# Patient Record
Sex: MALE | Race: BLACK OR AFRICAN AMERICAN | NOT HISPANIC OR LATINO | Employment: FULL TIME | URBAN - METROPOLITAN AREA
[De-identification: names, ages, dates, MRNs, and addresses within clinical notes are randomized per-mention and may not be internally consistent; named-entity substitution may affect disease eponyms.]

---

## 2021-11-03 ENCOUNTER — OFFICE VISIT (OUTPATIENT)
Dept: SLEEP MEDICINE | Facility: HOSPITAL | Age: 46
End: 2021-11-03

## 2021-11-03 VITALS
WEIGHT: 315 LBS | HEIGHT: 73 IN | HEART RATE: 67 BPM | DIASTOLIC BLOOD PRESSURE: 100 MMHG | SYSTOLIC BLOOD PRESSURE: 166 MMHG | OXYGEN SATURATION: 90 % | BODY MASS INDEX: 41.75 KG/M2

## 2021-11-03 DIAGNOSIS — G47.8 NON-RESTORATIVE SLEEP: ICD-10-CM

## 2021-11-03 DIAGNOSIS — E66.01 MORBID OBESITY (HCC): ICD-10-CM

## 2021-11-03 DIAGNOSIS — G47.33 OBSTRUCTIVE SLEEP APNEA: Primary | ICD-10-CM

## 2021-11-03 DIAGNOSIS — G47.10 HYPERSOMNIA: ICD-10-CM

## 2021-11-03 DIAGNOSIS — G47.26 CIRCADIAN RHYTHM SLEEP DISORDER, SHIFT WORK TYPE: ICD-10-CM

## 2021-11-03 PROCEDURE — 99203 OFFICE O/P NEW LOW 30 MIN: CPT | Performed by: FAMILY MEDICINE

## 2021-11-03 PROCEDURE — G0463 HOSPITAL OUTPT CLINIC VISIT: HCPCS

## 2021-11-03 NOTE — PROGRESS NOTES
[facility] New Patient/Consultation       Reason for Consultation: ANNA      Patient Care Team:  Provider, No Known as PCP - General  Brandon Gallardo MD as Consulting Physician (Sleep Medicine)      History of present illness:  Thank you for asking me to see your patient.  The patient is a 46 y.o. male with history of severe struct of sleep apnea presents today to reestablish care. We have a copy of in lab split study from February 2013. At that time patient weighed 352.1 pounds. Today patient weighs 385 pounds. Found to have severe obstructive sleep apnea with overall AHI of 89.4. He was initially trialed on auto CPAP 12-20 cm H2O. Today patient reports did notice benefit with his auto CPAP machine however was not able to use it consistently enough and insurance took the machine back. He is very motivated to get back into care. He works night shift.    Sleep Schedule:  Bed time: 9 AM  Sleep latency: About 10 minutes  Wake time: 2 PM  Average hours slept: 5-6  Non-restorative sleep: Yes  Number of naps per day: 3-4  Rotating shifts?: Works night shift  Nocturia: Up to 2 times a night  Electronics in bedroom: No    Excessive daytime sleepiness or drowsiness:Y  Any accidents at work due to sleepiness in the last 5 years:N  Any difficulty driving due to sleepiness or being drowsy: N  Weight changed in the last 5 years:Y - GAINED 25 LBS    Snoring:Y  Witnessed apneas:Y  Have you ever awakened gasping for breath, coughing, choking or respiratory discomfort: Y  Morning headaches: Y  Awaken with a sore throat or dry mouth: N    Any reports of leg jerking at night: N  Urge sensations: N  Does pain disrupt sleep: N  Sweating during sleep: Y  Teeth grinding: N    Any sudden episodes of sleep during the day: N  Sleep paralysis/hallucinations: N  Muscle weakness with laughing/anger: N  Nightmares: N  Sleep walking: N    Are you sleepy when you increase your sleep time: Y  Do you sleep better away from your own bed:  "N    ESS: 18    Social History: ; tobacco use ages [age] 2-3 beers per week 1 caffeinated beverage a day    Review of Systems:    A complete review of systems was done and all were negative with the exception of frequent urination joint pain swollen ankles    Allergies:  No known drug allergy    Family History: ANNA no       Current Outpatient Medications:   •  hydrochlorothiazide (HYDRODIURIL) 25 MG tablet, Take 25 mg by mouth daily., Disp: , Rfl:   •  Potassium Chloride crystals, Take  by mouth daily., Disp: , Rfl:   •  prednisoLONE acetate (PRED FORTE) 1 % ophthalmic suspension, Apply  to eye., Disp: , Rfl:   •  Tobramycin-Dexamethasone 0.3-0.05 % suspension, Apply  to eye 2 (two) times a day., Disp: , Rfl:   •  triamcinolone (KENALOG) 0.1 % ointment, Apply  topically 2 (two) times a day., Disp: , Rfl:     Vital Signs:    Vitals:    11/03/21 0700   BP: 166/100   Pulse: 67   SpO2: 90%   Weight: (!) 175 kg (385 lb)   Height: 185.4 cm (73\")      Body mass index is 50.79 kg/m².         Physical Exam:   General Alert and oriented. No acute distress noted   Pharynx/Throat Class IV Mallampati airway, large tongue, no evidence of redundant lateral pharyngeal tissue. No oral lesions. No thrush. Moist mucous membranes.   Head Normocephalic. Symmetrical. Atraumatic.    Nose No septal deviation. No drainage   Chest Wall Normal shape. Symmetric expansion with respiration. No tenderness.   Neck Trachea midline, no thyromegaly or adenopathy    Lungs Clear to auscultation bilaterally. No wheezes. No rhonchi. No rales. Respirations regular, even and unlabored.   Heart Regular rhythm and normal rate. Normal S1 and S2. No murmur   Abdomen Soft, non-tender and non-distended. Normal bowel sounds. No masses.   Extremities Moves all extremities well. No edema   Psychiatric Normal mood and affect.       Impression:  1. Obstructive sleep apnea    2. Hypersomnia    3. Non-restorative sleep    4. Morbid obesity (HCC)    5. " Circadian rhythm sleep disorder, shift work type        Plan:    Good sleep hygiene measures should be maintained.  Weight loss would be beneficial in this patient who is morbidly obese with BMI 50.8.    I discussed the pathophysiology of obstructive sleep apnea with the patient.  We discussed the adverse outcomes associated with untreated sleep-disordered breathing.  We discussed treatment modalities of obstructive sleep apnea including CPAP device as well as oral mandibular advancement device. Sleep study will be scheduled to establish definitive diagnosis of sleep disorder breathing.  Weight loss will be strongly beneficial in order to reduce the severity of sleep-disordered breathing.  Patient has narrow oropharyngeal structure.  Caution during activities that require prolonged concentration is strongly advised.  Patient will be notified of sleep study results after sleep study is completed.  If sleep apnea is only mild,  oral mandibular advancement device may be one of the treatment options.  However if sleep apnea is moderately severe, CPAP treatment will be strongly encouraged.  The patient is not opposed to treatment with CPAP device if we confirm significant obstructive sleep apnea on polysomnography.     Thank you for allowing me to participate in your patient's care.    Brandon Gallardo MD  Sleep Medicine  11/03/21  08:55 EDT

## 2021-11-30 ENCOUNTER — HOSPITAL ENCOUNTER (OUTPATIENT)
Dept: SLEEP MEDICINE | Facility: HOSPITAL | Age: 46
Discharge: HOME | End: 2021-11-30
Admitting: FAMILY MEDICINE

## 2021-11-30 DIAGNOSIS — G47.26 CIRCADIAN RHYTHM SLEEP DISORDER, SHIFT WORK TYPE: ICD-10-CM

## 2021-11-30 DIAGNOSIS — G47.10 HYPERSOMNIA: ICD-10-CM

## 2021-11-30 DIAGNOSIS — G47.33 OBSTRUCTIVE SLEEP APNEA: ICD-10-CM

## 2021-11-30 DIAGNOSIS — E66.01 MORBID OBESITY (HCC): ICD-10-CM

## 2021-11-30 DIAGNOSIS — G47.8 NON-RESTORATIVE SLEEP: ICD-10-CM

## 2021-11-30 PROCEDURE — 95806 SLEEP STUDY UNATT&RESP EFFT: CPT | Performed by: FAMILY MEDICINE

## 2021-11-30 PROCEDURE — 95806 SLEEP STUDY UNATT&RESP EFFT: CPT

## 2021-12-09 ENCOUNTER — TELEPHONE (OUTPATIENT)
Dept: SLEEP MEDICINE | Facility: HOSPITAL | Age: 46
End: 2021-12-09

## 2021-12-13 DIAGNOSIS — G47.33 OBSTRUCTIVE SLEEP APNEA: Primary | ICD-10-CM

## 2021-12-13 DIAGNOSIS — G47.8 NON-RESTORATIVE SLEEP: ICD-10-CM

## 2021-12-13 DIAGNOSIS — E66.01 MORBID OBESITY (HCC): ICD-10-CM

## 2021-12-13 DIAGNOSIS — G47.26 CIRCADIAN RHYTHM SLEEP DISORDER, SHIFT WORK TYPE: ICD-10-CM

## 2021-12-13 DIAGNOSIS — G47.10 HYPERSOMNIA: ICD-10-CM

## 2021-12-13 RX ORDER — ZOLPIDEM TARTRATE 5 MG/1
TABLET ORAL
Qty: 2 TABLET | Refills: 0 | Status: SHIPPED | OUTPATIENT
Start: 2021-12-13

## 2021-12-21 ENCOUNTER — LAB (OUTPATIENT)
Dept: LAB | Facility: HOSPITAL | Age: 46
End: 2021-12-21

## 2021-12-21 DIAGNOSIS — G47.10 HYPERSOMNIA: ICD-10-CM

## 2021-12-21 DIAGNOSIS — G47.33 OBSTRUCTIVE SLEEP APNEA: ICD-10-CM

## 2021-12-21 DIAGNOSIS — E66.01 MORBID OBESITY (HCC): ICD-10-CM

## 2021-12-21 DIAGNOSIS — G47.26 CIRCADIAN RHYTHM SLEEP DISORDER, SHIFT WORK TYPE: ICD-10-CM

## 2021-12-21 DIAGNOSIS — G47.8 NON-RESTORATIVE SLEEP: ICD-10-CM

## 2021-12-21 LAB — SARS-COV-2 ORF1AB RESP QL NAA+PROBE: NOT DETECTED

## 2021-12-21 PROCEDURE — C9803 HOPD COVID-19 SPEC COLLECT: HCPCS

## 2021-12-21 PROCEDURE — U0004 COV-19 TEST NON-CDC HGH THRU: HCPCS

## 2021-12-23 ENCOUNTER — HOSPITAL ENCOUNTER (OUTPATIENT)
Dept: SLEEP MEDICINE | Facility: HOSPITAL | Age: 46
Discharge: HOME | End: 2021-12-23
Admitting: FAMILY MEDICINE

## 2021-12-23 DIAGNOSIS — G47.8 NON-RESTORATIVE SLEEP: ICD-10-CM

## 2021-12-23 DIAGNOSIS — G47.33 OBSTRUCTIVE SLEEP APNEA: ICD-10-CM

## 2021-12-23 DIAGNOSIS — E66.01 MORBID OBESITY: ICD-10-CM

## 2021-12-23 DIAGNOSIS — G47.10 HYPERSOMNIA: ICD-10-CM

## 2021-12-23 DIAGNOSIS — G47.26 CIRCADIAN RHYTHM SLEEP DISORDER, SHIFT WORK TYPE: ICD-10-CM

## 2021-12-23 PROCEDURE — 95811 POLYSOM 6/>YRS CPAP 4/> PARM: CPT

## 2021-12-23 PROCEDURE — 95811 POLYSOM 6/>YRS CPAP 4/> PARM: CPT | Performed by: FAMILY MEDICINE

## 2022-01-09 DIAGNOSIS — G47.33 SEVERE OBSTRUCTIVE SLEEP APNEA: Primary | ICD-10-CM

## 2022-01-09 DIAGNOSIS — E66.01 MORBID OBESITY: ICD-10-CM

## 2022-01-09 DIAGNOSIS — G47.8 NON-RESTORATIVE SLEEP: ICD-10-CM

## 2022-01-09 DIAGNOSIS — G47.26 CIRCADIAN RHYTHM SLEEP DISORDER, SHIFT WORK TYPE: ICD-10-CM

## 2022-01-09 DIAGNOSIS — G47.10 HYPERSOMNIA: ICD-10-CM

## 2022-01-11 ENCOUNTER — TELEPHONE (OUTPATIENT)
Dept: SLEEP MEDICINE | Facility: HOSPITAL | Age: 47
End: 2022-01-11

## 2022-01-11 NOTE — TELEPHONE ENCOUNTER
Called pt with sleep results and faxed orders to [facility] on 1/11/2022.  Pt to call and schedule f/u appt with Dr. Gallardo after receiving new machine.  CV

## 2022-03-09 ENCOUNTER — OFFICE VISIT (OUTPATIENT)
Dept: SLEEP MEDICINE | Facility: HOSPITAL | Age: 47
End: 2022-03-09

## 2022-03-09 VITALS
SYSTOLIC BLOOD PRESSURE: 160 MMHG | HEART RATE: 62 BPM | WEIGHT: 315 LBS | HEIGHT: 73 IN | DIASTOLIC BLOOD PRESSURE: 97 MMHG | OXYGEN SATURATION: 93 % | BODY MASS INDEX: 41.75 KG/M2

## 2022-03-09 DIAGNOSIS — E66.01 MORBID OBESITY: ICD-10-CM

## 2022-03-09 DIAGNOSIS — G47.33 SEVERE OBSTRUCTIVE SLEEP APNEA: Primary | ICD-10-CM

## 2022-03-09 DIAGNOSIS — G47.26 CIRCADIAN RHYTHM SLEEP DISORDER, SHIFT WORK TYPE: ICD-10-CM

## 2022-03-09 PROCEDURE — G0463 HOSPITAL OUTPT CLINIC VISIT: HCPCS

## 2022-03-09 PROCEDURE — 99213 OFFICE O/P EST LOW 20 MIN: CPT | Performed by: FAMILY MEDICINE

## 2022-03-09 NOTE — PROGRESS NOTES
Follow Up Sleep Disorders [facility] Note     Chief Complaint:  ANNA     Primary Care Physician: Provider, No Known    Tye ARROYO Michele is a 46 y.o.male  was last seen at [facility] sleep lab: 12/23/2021 for in lab split study which showed obstructive sleep apnea with overall .9 events per hour lowest SPO2 of 68%.  Titrated to BiPAP 25/20 cm H2O.  Started on BiPAP 25/20 with flex of 3.  Presents today for first follow-up visit since starting BiPAP machine.  Bedtime and wake time varies ESS of 10.  No sleep-related issues since last visit.  Nasal pillow mask which fits well does not leak air.  Some dry mouth.  No problems with mask or machine otherwise.  Dramatic improvement in hypersomnia nonrestorative sleep.    Results Review:  DME is [facility].  Downloads between 2/6/2022-3/7/2022.  Average usage is 3 hours 54 minutes.  Average AHI is 3.8.  Average AutoCPAP pressure is 25/20 cm H2O.    Current Medications:    Current Outpatient Medications:   •  hydrochlorothiazide (HYDRODIURIL) 25 MG tablet, Take 25 mg by mouth daily., Disp: , Rfl:   •  Potassium Chloride crystals, Take  by mouth daily., Disp: , Rfl:   •  prednisoLONE acetate (PRED FORTE) 1 % ophthalmic suspension, Apply  to eye., Disp: , Rfl:   •  Tobramycin-Dexamethasone 0.3-0.05 % suspension, Apply  to eye 2 (two) times a day., Disp: , Rfl:   •  triamcinolone (KENALOG) 0.1 % ointment, Apply  topically 2 (two) times a day., Disp: , Rfl:   •  zolpidem (Ambien) 5 MG tablet, Take one tab as needed for sleep at night of sleep study only. Do not use at home., Disp: 2 tablet, Rfl: 0   also entered in Sleep Questionnaire    Patient  has a past medical history of Hypertension and Sleep apnea.    Social History:    Social History     Socioeconomic History   • Marital status:    Tobacco Use   • Smoking status: Current Every Day Smoker     Packs/day: 0.50   Substance and Sexual Activity   • Alcohol use: Yes     Alcohol/week: 6.0 standard drinks     Types: 6 Cans of  "beer per week   • Drug use: Defer   • Sexual activity: Defer       Allergies:  No known drug allergy    Vital Signs:    Vitals:    03/09/22 1500   BP: 160/97   Pulse: 62   SpO2: 93%   Weight: (!) 173 kg (382 lb)   Height: 185.4 cm (73\")     Body mass index is 50.4 kg/m².    REVIEW OF SYSTEMS.  Full review of systems available on the intake form which is scanned in the media tab.  The relevant positive are noted below  1. Daytime excessive sleepiness with [place] Sleepiness Scale :Total score: 10   2. Snoring  3. All negative      Physical exam:  Vitals:    03/09/22 1500   BP: 160/97   Pulse: 62   SpO2: 93%   Weight: (!) 173 kg (382 lb)   Height: 185.4 cm (73\")    Body mass index is 50.4 kg/m².    HEENT: Head is atraumatic, normocephalic  Eyes: pupils are round equal and reacting to light and accommodation, conjunctiva normal  Nose: no nasal septal defects or deviation and the nasal passages are clear, no nasal polyps,  Throat: tongue normal, oral airway Mallampati class 4  NECK: , trachea is in the midline, thyroid not enlarged  RESPIRATORY SYSTEM: Breath sounds are equal on both sides, there are no wheezes   CARDIOVASULAR SYSTEM: Heart sounds are regular rhythm and kady rate, no edema  EXTREMITES: No cyanosis, clubbing  NEUROLOGICAL SYSTEM: Oriented x 3, no gross motor defects, gait normal    Impression:  1. Severe obstructive sleep apnea    2. Morbid obesity (HCC)    3. Circadian rhythm sleep disorder, shift work type        Obstructive sleep apnea adequately treated with BiPAP 25/20 cm H2O with good compliance and usage and no complaints of hypersomnolence.  Patient will work on increasing compliance.  Rotating shifts make this difficult at times.  Return to clinic in 3 months for follow-up or sooner if needed.    Patient uses the CPAP device and benefits from its use in terms of reduction of hypersomnia and snoring.Weight loss will be strongly beneficial to reduce the severity of sleep-disordered breathing.  " Caution during activities that require prolonged concentration is strongly advised if sleepiness returns. Changing of PAP supplies regularly is important for effective use. Patient needs to change cushion on the mask or plugs on nasal pillows along with disposable filters once every month and change mask frame, tubing, headgear and Velcro straps every 6 months at the minimum.    Brandon Gallardo MD  Sleep Medicine  03/09/22  16:30 EST

## 2022-06-15 ENCOUNTER — APPOINTMENT (OUTPATIENT)
Dept: SLEEP MEDICINE | Facility: HOSPITAL | Age: 47
End: 2022-06-15

## 2022-06-15 NOTE — PROGRESS NOTES
Follow Up Sleep Disorders Center Note     Chief Complaint:  ANNA     Primary Care Physician: Provider, No Known    Tye ARROYO Michele is a 47 y.o.male  was last seen at [facility] sleep lab: 3/9/2022.  December 2021 split study showed .9 lowest SPO2 of 68%.  Started on BiPAP 25/20 with flex of 3.  Patient was doing very well at first follow-up visit.  He was working on increasing compliance.  Rotating shifts are making that difficult.  Presents today for 3-month follow-up.  No problems with mask machine hypersomnia or nonrestorative sleep.    Results Review:  DME is ***.  Downloads between ***.  Average usage is ***.  Average AHI is ***.  Average AutoCPAP pressure is ***.    Current Medications:    Current Outpatient Medications:   •  hydrochlorothiazide (HYDRODIURIL) 25 MG tablet, Take 25 mg by mouth daily., Disp: , Rfl:   •  Potassium Chloride crystals, Take  by mouth daily., Disp: , Rfl:   •  prednisoLONE acetate (PRED FORTE) 1 % ophthalmic suspension, Apply  to eye., Disp: , Rfl:   •  Tobramycin-Dexamethasone 0.3-0.05 % suspension, Apply  to eye 2 (two) times a day., Disp: , Rfl:   •  triamcinolone (KENALOG) 0.1 % ointment, Apply  topically 2 (two) times a day., Disp: , Rfl:   •  zolpidem (Ambien) 5 MG tablet, Take one tab as needed for sleep at night of sleep study only. Do not use at home., Disp: 2 tablet, Rfl: 0   also entered in Sleep Questionnaire    Patient  has a past medical history of Hypertension and Sleep apnea.    Social History:    Social History     Socioeconomic History   • Marital status:    Tobacco Use   • Smoking status: Current Every Day Smoker     Packs/day: 0.50   Substance and Sexual Activity   • Alcohol use: Yes     Alcohol/week: 6.0 standard drinks     Types: 6 Cans of beer per week   • Drug use: Defer   • Sexual activity: Defer       Allergies:  No known drug allergy    Vital Signs:  There were no vitals filed for this visit.  There is no height or weight on file to calculate  BMI.    REVIEW OF SYSTEMS.  Full review of systems available on the intake form which is scanned in the media tab.  The relevant positive are noted below  1. Daytime excessive sleepiness with [place] Sleepiness Scale :    2. Snoring  3. ***      Physical exam:  There were no vitals filed for this visit. There is no height or weight on file to calculate BMI.    HEENT: Head is atraumatic, normocephalic  Eyes: pupils are round equal and reacting to light and accommodation, conjunctiva normal  Nose: no nasal septal defects or deviation and the nasal passages are clear, no nasal polyps,  Throat: tongue normal, oral airway Mallampati class 4  NECK: , trachea is in the midline, thyroid not enlarged  RESPIRATORY SYSTEM: Regular respirations  CARDIOVASULAR SYSTEM: Regular rate  EXTREMITES: No cyanosis, clubbing  NEUROLOGICAL SYSTEM: Oriented x 3, no gross motor defects, gait normal    Impression:  1. Severe obstructive sleep apnea    2. Morbid obesity (HCC)    3. Circadian rhythm sleep disorder, shift work type        Obstructive sleep apnea adequately treated with BiPAP 25/20 cm H2O with good compliance and usage and no complaints of hypersomnolence.  Return to clinic in 6 months for follow-up or sooner if needed.    Patient uses the CPAP device and benefits from its use in terms of reduction of hypersomnia and snoring.Weight loss will be strongly beneficial to reduce the severity of sleep-disordered breathing.  Caution during activities that require prolonged concentration is strongly advised if sleepiness returns. Changing of PAP supplies regularly is important for effective use. Patient needs to change cushion on the mask or plugs on nasal pillows along with disposable filters once every month and change mask frame, tubing, headgear and Velcro straps every 6 months at the minimum.    Brandon Gallardo MD  Sleep Medicine  06/15/22  13:21 EDT